# Patient Record
Sex: MALE | Race: WHITE | HISPANIC OR LATINO | Employment: PART TIME | URBAN - METROPOLITAN AREA
[De-identification: names, ages, dates, MRNs, and addresses within clinical notes are randomized per-mention and may not be internally consistent; named-entity substitution may affect disease eponyms.]

---

## 2024-01-05 ENCOUNTER — LAB (OUTPATIENT)
Dept: LAB | Facility: LAB | Age: 29
End: 2024-01-05
Payer: COMMERCIAL

## 2024-01-05 ENCOUNTER — OFFICE VISIT (OUTPATIENT)
Dept: PRIMARY CARE | Facility: CLINIC | Age: 29
End: 2024-01-05
Payer: COMMERCIAL

## 2024-01-05 VITALS
WEIGHT: 155 LBS | HEART RATE: 67 BPM | OXYGEN SATURATION: 99 % | DIASTOLIC BLOOD PRESSURE: 70 MMHG | HEIGHT: 71 IN | SYSTOLIC BLOOD PRESSURE: 137 MMHG | BODY MASS INDEX: 21.7 KG/M2

## 2024-01-05 DIAGNOSIS — Z00.00 ROUTINE HEALTH MAINTENANCE: ICD-10-CM

## 2024-01-05 DIAGNOSIS — Z11.59 NEED FOR HEPATITIS B SCREENING TEST: ICD-10-CM

## 2024-01-05 DIAGNOSIS — L21.0 DANDRUFF IN ADULT: ICD-10-CM

## 2024-01-05 DIAGNOSIS — Z11.3 ROUTINE SCREENING FOR STI (SEXUALLY TRANSMITTED INFECTION): ICD-10-CM

## 2024-01-05 DIAGNOSIS — Z11.4 SCREENING FOR HIV (HUMAN IMMUNODEFICIENCY VIRUS): Primary | ICD-10-CM

## 2024-01-05 DIAGNOSIS — Z11.4 SCREENING FOR HIV (HUMAN IMMUNODEFICIENCY VIRUS): ICD-10-CM

## 2024-01-05 DIAGNOSIS — Z11.59 NEED FOR HEPATITIS C SCREENING TEST: ICD-10-CM

## 2024-01-05 PROBLEM — R07.9 CHEST PAIN: Status: ACTIVE | Noted: 2024-01-05

## 2024-01-05 PROBLEM — M10.9 GOUT: Status: ACTIVE | Noted: 2024-01-05

## 2024-01-05 PROBLEM — K40.90 LEFT INGUINAL HERNIA: Status: ACTIVE | Noted: 2024-01-05

## 2024-01-05 PROBLEM — R00.2 PALPITATIONS: Status: ACTIVE | Noted: 2024-01-05

## 2024-01-05 LAB
ALBUMIN SERPL BCP-MCNC: 4.8 G/DL (ref 3.4–5)
ALP SERPL-CCNC: 64 U/L (ref 33–120)
ALT SERPL W P-5'-P-CCNC: 11 U/L (ref 10–52)
ANION GAP SERPL CALC-SCNC: 8 MMOL/L (ref 10–20)
AST SERPL W P-5'-P-CCNC: 13 U/L (ref 9–39)
BILIRUB SERPL-MCNC: 0.5 MG/DL (ref 0–1.2)
BUN SERPL-MCNC: 9 MG/DL (ref 6–23)
CALCIUM SERPL-MCNC: 9.3 MG/DL (ref 8.6–10.3)
CHLORIDE SERPL-SCNC: 103 MMOL/L (ref 98–107)
CHOLEST SERPL-MCNC: 126 MG/DL (ref 0–199)
CHOLESTEROL/HDL RATIO: 2.8
CO2 SERPL-SCNC: 30 MMOL/L (ref 21–32)
CREAT SERPL-MCNC: 0.7 MG/DL (ref 0.5–1.3)
ERYTHROCYTE [DISTWIDTH] IN BLOOD BY AUTOMATED COUNT: 12.1 % (ref 11.5–14.5)
GFR SERPL CREATININE-BSD FRML MDRD: >90 ML/MIN/1.73M*2
GLUCOSE SERPL-MCNC: 89 MG/DL (ref 74–99)
HBV SURFACE AG SERPL QL IA: NONREACTIVE
HCT VFR BLD AUTO: 47.6 % (ref 41–52)
HCV AB SER QL: NONREACTIVE
HDLC SERPL-MCNC: 44.8 MG/DL
HERPES SIMPLEX VIRUS 1 IGG: 0.6 INDEX
HERPES SIMPLEX VIRUS 2 IGG: <0.2 INDEX
HGB BLD-MCNC: 15.9 G/DL (ref 13.5–17.5)
HIV 1+2 AB+HIV1 P24 AG SERPL QL IA: NONREACTIVE
LDLC SERPL CALC-MCNC: 64 MG/DL
MCH RBC QN AUTO: 30.7 PG (ref 26–34)
MCHC RBC AUTO-ENTMCNC: 33.4 G/DL (ref 32–36)
MCV RBC AUTO: 92 FL (ref 80–100)
NON HDL CHOLESTEROL: 81 MG/DL (ref 0–149)
NRBC BLD-RTO: 0 /100 WBCS (ref 0–0)
PLATELET # BLD AUTO: 246 X10*3/UL (ref 150–450)
POTASSIUM SERPL-SCNC: 3.9 MMOL/L (ref 3.5–5.3)
PROT SERPL-MCNC: 7.4 G/DL (ref 6.4–8.2)
RBC # BLD AUTO: 5.18 X10*6/UL (ref 4.5–5.9)
SODIUM SERPL-SCNC: 137 MMOL/L (ref 136–145)
T PALLIDUM AB SER QL: NONREACTIVE
TRIGL SERPL-MCNC: 87 MG/DL (ref 0–149)
VLDL: 17 MG/DL (ref 0–40)
WBC # BLD AUTO: 5.1 X10*3/UL (ref 4.4–11.3)

## 2024-01-05 PROCEDURE — 86695 HERPES SIMPLEX TYPE 1 TEST: CPT

## 2024-01-05 PROCEDURE — 86803 HEPATITIS C AB TEST: CPT

## 2024-01-05 PROCEDURE — 86696 HERPES SIMPLEX TYPE 2 TEST: CPT

## 2024-01-05 PROCEDURE — 86694 HERPES SIMPLEX NES ANTBDY: CPT

## 2024-01-05 PROCEDURE — 87340 HEPATITIS B SURFACE AG IA: CPT

## 2024-01-05 PROCEDURE — 99395 PREV VISIT EST AGE 18-39: CPT | Performed by: FAMILY MEDICINE

## 2024-01-05 PROCEDURE — 87800 DETECT AGNT MULT DNA DIREC: CPT

## 2024-01-05 PROCEDURE — 85027 COMPLETE CBC AUTOMATED: CPT

## 2024-01-05 PROCEDURE — 80061 LIPID PANEL: CPT

## 2024-01-05 PROCEDURE — 86780 TREPONEMA PALLIDUM: CPT

## 2024-01-05 PROCEDURE — 36415 COLL VENOUS BLD VENIPUNCTURE: CPT

## 2024-01-05 PROCEDURE — 1036F TOBACCO NON-USER: CPT | Performed by: FAMILY MEDICINE

## 2024-01-05 PROCEDURE — 80053 COMPREHEN METABOLIC PANEL: CPT

## 2024-01-05 PROCEDURE — 87389 HIV-1 AG W/HIV-1&-2 AB AG IA: CPT

## 2024-01-05 PROCEDURE — 87661 TRICHOMONAS VAGINALIS AMPLIF: CPT

## 2024-01-05 RX ORDER — KETOCONAZOLE 20 MG/ML
1 SHAMPOO, SUSPENSION TOPICAL 2 TIMES WEEKLY
COMMUNITY
End: 2024-01-05 | Stop reason: SDUPTHER

## 2024-01-05 RX ORDER — KETOCONAZOLE 20 MG/ML
1 SHAMPOO, SUSPENSION TOPICAL 2 TIMES WEEKLY
Qty: 120 ML | Refills: 2 | Status: SHIPPED | OUTPATIENT
Start: 2024-01-08

## 2024-01-05 SDOH — HEALTH STABILITY: PHYSICAL HEALTH: ON AVERAGE, HOW MANY MINUTES DO YOU ENGAGE IN EXERCISE AT THIS LEVEL?: 0 MIN

## 2024-01-05 SDOH — ECONOMIC STABILITY: TRANSPORTATION INSECURITY
IN THE PAST 12 MONTHS, HAS LACK OF TRANSPORTATION KEPT YOU FROM MEETINGS, WORK, OR FROM GETTING THINGS NEEDED FOR DAILY LIVING?: PATIENT DECLINED

## 2024-01-05 SDOH — HEALTH STABILITY: PHYSICAL HEALTH: ON AVERAGE, HOW MANY DAYS PER WEEK DO YOU ENGAGE IN MODERATE TO STRENUOUS EXERCISE (LIKE A BRISK WALK)?: 0 DAYS

## 2024-01-05 SDOH — ECONOMIC STABILITY: TRANSPORTATION INSECURITY
IN THE PAST 12 MONTHS, HAS THE LACK OF TRANSPORTATION KEPT YOU FROM MEDICAL APPOINTMENTS OR FROM GETTING MEDICATIONS?: PATIENT DECLINED

## 2024-01-05 SDOH — ECONOMIC STABILITY: GENERAL
WHICH OF THE FOLLOWING DO YOU KNOW HOW TO USE AND HAVE ACCESS TO EVERY DAY? (CHOOSE ALL THAT APPLY): DESKTOP COMPUTER, LAPTOP COMPUTER, OR TABLET WITH BROADBAND INTERNET CONNECTION;SMARTPHONE WITH CELLULAR DATA PLAN

## 2024-01-05 ASSESSMENT — SOCIAL DETERMINANTS OF HEALTH (SDOH): HOW HARD IS IT FOR YOU TO PAY FOR THE VERY BASICS LIKE FOOD, HOUSING, MEDICAL CARE, AND HEATING?: PATIENT DECLINED

## 2024-01-05 ASSESSMENT — ENCOUNTER SYMPTOMS
HEADACHES: 0
FEVER: 0
SLEEP DISTURBANCE: 1
COUGH: 0
NERVOUS/ANXIOUS: 1

## 2024-01-05 NOTE — PROGRESS NOTES
"Subjective   Patient ID: Rory Colon is a 28 y.o. male who presents for Annual Exam.    HPI   He is here today for annual physical  He is interested in STI screening.  He had a new female partner approximately 3 months ago  He does have a history of chlamydia which was treated in the past  Since exposure he has noticed slight tingling as well as a possible rash in his genital area.  No urethral discharge  He is using ketoconazole shampoo monthly for dandruff which seems to work well  He does not smoke.  He does drink alcohol, typically 1-2 drinks per day, but up to 5 on the weekends  Received Tdap vaccine in 2018  He recently lost his job in the fall and is currently looking for a new job  He has had some anxiety but feels that this has not been significant enough to need any medication  He has been doing well with diet, but is not getting as much exercise  There is a family history of breast cancer in his mother.  Colon cancer in his father in his 50s.  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  This is improving but still has a scab there    Review of Systems   Constitutional:  Negative for fever.   Respiratory:  Negative for cough.    Cardiovascular:  Negative for chest pain.   Neurological:  Negative for headaches.   Psychiatric/Behavioral:  Positive for sleep disturbance. The patient is nervous/anxious.        Objective   /70   Pulse 67   Ht 1.803 m (5' 11\")   Wt 70.3 kg (155 lb)   SpO2 99%   BMI 21.62 kg/m²     Physical Exam  Vitals reviewed.   Constitutional:       General: He is not in acute distress.     Appearance: Normal appearance. He is well-developed.   HENT:      Head: Normocephalic.      Right Ear: Tympanic membrane, ear canal and external ear normal.      Left Ear: Tympanic membrane, ear canal and external ear normal.      Nose: Nose normal.     "  Mouth/Throat:      Mouth: Mucous membranes are moist.   Eyes:      Conjunctiva/sclera: Conjunctivae normal.   Neck:      Thyroid: No thyromegaly.      Vascular: No JVD.   Cardiovascular:      Rate and Rhythm: Normal rate and regular rhythm.      Heart sounds: Normal heart sounds.   Pulmonary:      Effort: Pulmonary effort is normal.      Breath sounds: Normal breath sounds.   Abdominal:      Palpations: Abdomen is soft.      Tenderness: There is no abdominal tenderness.   Genitourinary:     Penis: Normal.    Musculoskeletal:         General: Normal range of motion.      Right lower leg: No edema.      Left lower leg: No edema.   Lymphadenopathy:      Cervical: No cervical adenopathy.   Skin:     Findings: No rash.      Comments: There is a small scab left medial thigh in the area of previous drill injury.  There is no significant swelling or mass palpated, tenderness or signs of infection   Neurological:      Mental Status: He is alert and oriented to person, place, and time.   Psychiatric:         Mood and Affect: Mood normal.         Behavior: Behavior normal.         Assessment/Plan   Problem List Items Addressed This Visit          Medium    Dandruff in adult    Relevant Medications    ketoconazole (NIZOral) 2 % shampoo (Start on 1/8/2024)     Other Visit Diagnoses       Screening for HIV (human immunodeficiency virus)    -  Primary    Relevant Orders    C. Trachomatis / N. Gonorrhoeae, Amplified Detection    Trichomonas vaginalis, Nucleic Acid Detection    HIV 1/2 Antigen/Antibody Screen with Reflex to Confirmation    HSV1 IgG and HSV2 IgG    HSV Type I/II IgM Antibody    Need for hepatitis C screening test        Relevant Orders    C. Trachomatis / N. Gonorrhoeae, Amplified Detection    Trichomonas vaginalis, Nucleic Acid Detection    Hepatitis C Antibody    HSV1 IgG and HSV2 IgG    HSV Type I/II IgM Antibody    Need for hepatitis B screening test        Relevant Orders    C. Trachomatis / N. Gonorrhoeae,  Amplified Detection    Trichomonas vaginalis, Nucleic Acid Detection    Hepatitis B Surface Antigen    HSV1 IgG and HSV2 IgG    HSV Type I/II IgM Antibody    Routine screening for STI (sexually transmitted infection)        Relevant Orders    C. Trachomatis / N. Gonorrhoeae, Amplified Detection    Trichomonas vaginalis, Nucleic Acid Detection    Syphilis Screen with Reflex    HSV1 IgG and HSV2 IgG    HSV Type I/II IgM Antibody    Routine health maintenance        Relevant Orders    CBC    Comprehensive Metabolic Panel    Lipid Panel        Healthy diet and regular exercise recommended  We discussed the importance of decreasing his alcohol intake.  He should not drink any more than 1 or 2 drinks at a time.  I did discuss that if he ever does want a referral to help with cessation he can call us and we can give him a referral  Up-to-date on tetanus vaccination.  Annual flu and COVID vaccines recommended  Recommended daily multivitamin  Routine dental cleanings recommended  We will obtain full testing for STDs.  He is also interested in getting screened for HSV-I did discuss with him ahead of time that if the antibody test is positive it does not necessarily mean that he has HSV, only that he has been previously exposed to the virus  There are no signs of any infection in previous drill injury, recommended continue to monitor and follow-up if does not fully resolve in the next few months  If otherwise doing well can follow-up in 1 year or as needed

## 2024-01-06 LAB
C TRACH RRNA SPEC QL NAA+PROBE: NEGATIVE
N GONORRHOEA DNA SPEC QL PROBE+SIG AMP: NEGATIVE
T VAGINALIS RRNA SPEC QL NAA+PROBE: NEGATIVE

## 2024-01-08 LAB — HSV1+2 IGM SER IA-ACNC: 1.4 IV

## 2024-01-09 ENCOUNTER — TELEPHONE (OUTPATIENT)
Dept: PRIMARY CARE | Facility: CLINIC | Age: 29
End: 2024-01-09
Payer: COMMERCIAL

## 2024-01-09 DIAGNOSIS — R89.4 POSITIVE TEST FOR HERPES SIMPLEX VIRUS (HSV) ANTIBODY: Primary | ICD-10-CM

## 2024-01-09 NOTE — TELEPHONE ENCOUNTER
I spoke to him over the phone regarding lab results  His HSV IgM antibody was positive at 1.  4 0.  IgG antibody was negative for HSV 1 and 2.  He has not had any recent lesions concerning for cold sore or genital HSV infection.  He was last active with a new partner in October.    We discussed HSV antibody tests in detail.  With this pattern, it would indicate that he may have been exposed to herpes simplex virus anywhere from the last few weeks to few months.  We discussed that normally an outbreak would occur 2 to 12 days after exposure, however with this pattern I cannot guarantee that he will not have an outbreak in the future.  We discussed that having a positive antibody test does not indicate that he has HSV, only that he has been exposed to the virus previously.  If he does get any symptoms including cold sores or any rash concerning for genital HSV recommended that he call or follow-up and we can discuss further evaluation and possible antiviral treatment.  We will plan on rechecking his IgM and IgG antibodies again in 3 to 4 months

## 2024-03-29 ENCOUNTER — LAB (OUTPATIENT)
Dept: LAB | Facility: LAB | Age: 29
End: 2024-03-29
Payer: COMMERCIAL

## 2024-03-29 DIAGNOSIS — R89.4 POSITIVE TEST FOR HERPES SIMPLEX VIRUS (HSV) ANTIBODY: ICD-10-CM

## 2024-03-29 LAB
HERPES SIMPLEX VIRUS 1 IGG: 0.6 INDEX
HERPES SIMPLEX VIRUS 2 IGG: <0.2 INDEX

## 2024-03-29 PROCEDURE — 36415 COLL VENOUS BLD VENIPUNCTURE: CPT

## 2024-03-29 PROCEDURE — 86695 HERPES SIMPLEX TYPE 1 TEST: CPT

## 2024-03-29 PROCEDURE — 86696 HERPES SIMPLEX TYPE 2 TEST: CPT

## 2024-03-29 PROCEDURE — 86694 HERPES SIMPLEX NES ANTBDY: CPT

## 2024-04-01 LAB — HSV1+2 IGM SER IA-ACNC: 0.91 IV

## 2024-04-02 ENCOUNTER — TELEPHONE (OUTPATIENT)
Dept: PRIMARY CARE | Facility: CLINIC | Age: 29
End: 2024-04-02
Payer: COMMERCIAL

## 2024-04-02 DIAGNOSIS — R89.4 POSITIVE TEST FOR HERPES SIMPLEX VIRUS (HSV) ANTIBODY: Primary | ICD-10-CM

## 2024-04-02 NOTE — TELEPHONE ENCOUNTER
I discussed results with him over the phone.  We ordered labs to follow-up on labs done in early January, which showed positive IgM HSV antibodies, and negative IgG antibodies.  His IgM antibodies are still positive at 0.91, but this has improved from 1.40 when checked in early January.  IgG antibodies are still negative.  He has not had any outbreaks including any cold sores or genital rash.  We discussed that this pattern would most likely indicate exposure to HSV at some point in the last several months.  If he does develop any symptoms including cold sores or genital rash, he should call us right away.  We discussed monitoring, versus rechecking his labs again, and he would prefer to recheck.  We we will recheck HSV IgG and IgM antibodies in 4 months and follow-up by phone to discuss results.  I would expect the IgM antibodies to be negative at that point

## 2024-11-26 ENCOUNTER — APPOINTMENT (OUTPATIENT)
Dept: PRIMARY CARE | Facility: CLINIC | Age: 29
End: 2024-11-26
Payer: COMMERCIAL

## 2024-11-26 VITALS
BODY MASS INDEX: 23.15 KG/M2 | TEMPERATURE: 97.3 F | HEART RATE: 81 BPM | OXYGEN SATURATION: 96 % | DIASTOLIC BLOOD PRESSURE: 70 MMHG | SYSTOLIC BLOOD PRESSURE: 111 MMHG | WEIGHT: 166 LBS

## 2024-11-26 DIAGNOSIS — J01.90 ACUTE SINUSITIS, RECURRENCE NOT SPECIFIED, UNSPECIFIED LOCATION: ICD-10-CM

## 2024-11-26 DIAGNOSIS — H60.509 ACUTE OTITIS EXTERNA, UNSPECIFIED LATERALITY, UNSPECIFIED TYPE: Primary | ICD-10-CM

## 2024-11-26 PROCEDURE — 1036F TOBACCO NON-USER: CPT | Performed by: FAMILY MEDICINE

## 2024-11-26 PROCEDURE — 99213 OFFICE O/P EST LOW 20 MIN: CPT | Performed by: FAMILY MEDICINE

## 2024-11-26 RX ORDER — AMOXICILLIN AND CLAVULANATE POTASSIUM 875; 125 MG/1; MG/1
1 TABLET, FILM COATED ORAL 2 TIMES DAILY
Qty: 14 TABLET | Refills: 0 | Status: SHIPPED | OUTPATIENT
Start: 2024-11-26 | End: 2024-12-03

## 2024-11-26 RX ORDER — NEOMYCIN SULFATE, POLYMYXIN B SULFATE AND HYDROCORTISONE 10; 3.5; 1 MG/ML; MG/ML; [USP'U]/ML
4 SUSPENSION/ DROPS AURICULAR (OTIC) 3 TIMES DAILY
Qty: 10 ML | Refills: 0 | Status: SHIPPED | OUTPATIENT
Start: 2024-11-26 | End: 2024-12-03

## 2024-11-26 ASSESSMENT — ENCOUNTER SYMPTOMS
SINUS PRESSURE: 1
FEVER: 0

## 2024-11-26 NOTE — PROGRESS NOTES
Subjective   Patient ID: Rory Colon is a 29 y.o. male who presents for Earache (Left ear pain x 3-4 weeks /Sinus pressure).    HPI       He has had left ear discomfort over the past 1 month.  This occurs on and off but will occur daily.  He has had intermittent clear drainage from his left ear.  He also gets occasional symptoms in his right ear  No trouble hearing  He has also had sinus pressure, as well as nasal congestion and postnasal drainage.      Review of Systems   Constitutional:  Negative for fever.   HENT:  Positive for congestion, ear pain and sinus pressure.        Objective   /70   Pulse 81   Temp 36.3 °C (97.3 °F) (Temporal)   Wt 75.3 kg (166 lb)   SpO2 96%   BMI 23.15 kg/m²     Physical Exam  Vitals reviewed.   Constitutional:       General: He is not in acute distress.     Appearance: Normal appearance.   HENT:      Head: Normocephalic.      Right Ear: Tympanic membrane, ear canal and external ear normal.      Left Ear: Tympanic membrane and external ear normal.      Ears:      Comments: Left external auditory canal is dry with mild erythema     Nose: Congestion present.      Comments: Left maxillary sinus tenderness to percussion     Mouth/Throat:      Mouth: Mucous membranes are moist.      Pharynx: No oropharyngeal exudate or posterior oropharyngeal erythema.   Eyes:      Conjunctiva/sclera: Conjunctivae normal.   Cardiovascular:      Rate and Rhythm: Normal rate and regular rhythm.      Heart sounds: Normal heart sounds.   Pulmonary:      Effort: Pulmonary effort is normal.      Breath sounds: Normal breath sounds.   Lymphadenopathy:      Cervical: No cervical adenopathy.   Skin:     Findings: No rash.   Neurological:      Mental Status: He is alert.   Psychiatric:         Mood and Affect: Mood normal.         Behavior: Behavior normal.         Assessment/Plan   Assessment & Plan  Acute otitis externa, unspecified laterality, unspecified type    Orders:    neomycin-polymyxin-HC  (Cortisporin) 3.5-10,000-1 mg/mL-unit/mL-% otic suspension; Administer 4 drops into affected ear(s) 3 times a day for 7 days.    Acute sinusitis, recurrence not specified, unspecified location    Orders:    amoxicillin-pot clavulanate (Augmentin) 875-125 mg tablet; Take 1 tablet by mouth 2 times a day for 7 days.    He has signs and symptoms of acute sinusitis, as well as mild left otitis externa.  We will treat with Augmentin p.o. twice daily x 7 days, as well as Cortisporin drops.  I did recommend that he follow-up in the next 2 weeks if symptoms do not resolve

## 2025-03-28 ENCOUNTER — APPOINTMENT (OUTPATIENT)
Dept: PRIMARY CARE | Facility: CLINIC | Age: 30
End: 2025-03-28
Payer: COMMERCIAL

## 2025-03-28 ENCOUNTER — HOSPITAL ENCOUNTER (OUTPATIENT)
Dept: RADIOLOGY | Facility: CLINIC | Age: 30
Discharge: HOME | End: 2025-03-28
Payer: COMMERCIAL

## 2025-03-28 VITALS
TEMPERATURE: 97.9 F | BODY MASS INDEX: 22.45 KG/M2 | HEART RATE: 75 BPM | SYSTOLIC BLOOD PRESSURE: 118 MMHG | DIASTOLIC BLOOD PRESSURE: 63 MMHG | OXYGEN SATURATION: 98 % | WEIGHT: 161 LBS

## 2025-03-28 DIAGNOSIS — L21.0 DANDRUFF IN ADULT: ICD-10-CM

## 2025-03-28 DIAGNOSIS — M25.511 RIGHT SHOULDER PAIN, UNSPECIFIED CHRONICITY: ICD-10-CM

## 2025-03-28 DIAGNOSIS — Z11.4 SCREENING FOR HIV (HUMAN IMMUNODEFICIENCY VIRUS): ICD-10-CM

## 2025-03-28 DIAGNOSIS — Z11.3 ROUTINE SCREENING FOR STI (SEXUALLY TRANSMITTED INFECTION): ICD-10-CM

## 2025-03-28 DIAGNOSIS — Z00.00 ROUTINE HEALTH MAINTENANCE: Primary | ICD-10-CM

## 2025-03-28 DIAGNOSIS — Z11.3 SCREENING FOR STD (SEXUALLY TRANSMITTED DISEASE): ICD-10-CM

## 2025-03-28 DIAGNOSIS — Z11.59 NEED FOR HEPATITIS B SCREENING TEST: ICD-10-CM

## 2025-03-28 DIAGNOSIS — Z11.59 NEED FOR HEPATITIS C SCREENING TEST: ICD-10-CM

## 2025-03-28 PROBLEM — R07.9 CHEST PAIN: Status: RESOLVED | Noted: 2024-01-05 | Resolved: 2025-03-28

## 2025-03-28 PROCEDURE — 73030 X-RAY EXAM OF SHOULDER: CPT | Mod: RT

## 2025-03-28 PROCEDURE — 99395 PREV VISIT EST AGE 18-39: CPT | Performed by: FAMILY MEDICINE

## 2025-03-28 PROCEDURE — 1036F TOBACCO NON-USER: CPT | Performed by: FAMILY MEDICINE

## 2025-03-28 RX ORDER — KETOCONAZOLE 20 MG/ML
1 SHAMPOO, SUSPENSION TOPICAL 2 TIMES WEEKLY
Qty: 120 ML | Refills: 2 | Status: SHIPPED | OUTPATIENT
Start: 2025-03-31

## 2025-03-28 SDOH — HEALTH STABILITY: PHYSICAL HEALTH: ON AVERAGE, HOW MANY MINUTES DO YOU ENGAGE IN EXERCISE AT THIS LEVEL?: 0 MIN

## 2025-03-28 SDOH — HEALTH STABILITY: PHYSICAL HEALTH: ON AVERAGE, HOW MANY DAYS PER WEEK DO YOU ENGAGE IN MODERATE TO STRENUOUS EXERCISE (LIKE A BRISK WALK)?: 0 DAYS

## 2025-03-28 ASSESSMENT — ENCOUNTER SYMPTOMS
COUGH: 0
NECK PAIN: 1
TINGLING: 1
FEVER: 0
ARTHRALGIAS: 1
NUMBNESS: 0

## 2025-03-28 ASSESSMENT — PATIENT HEALTH QUESTIONNAIRE - PHQ9
1. LITTLE INTEREST OR PLEASURE IN DOING THINGS: NOT AT ALL
SUM OF ALL RESPONSES TO PHQ9 QUESTIONS 1 & 2: 0
2. FEELING DOWN, DEPRESSED OR HOPELESS: NOT AT ALL

## 2025-03-28 NOTE — PROGRESS NOTES
Subjective   Patient ID: Rory Colon is a 29 y.o. male who presents for Annual Exam and Shoulder Pain.    Shoulder Pain   The pain is present in the right shoulder. Episode onset: since about janurary. The quality of the pain is described as sharp (Pop). Associated symptoms include tingling. Pertinent negatives include no fever or numbness.          He is here today for annual physical and also to discuss right shoulder pain  He has had right shoulder pain which has been present for 2 to 3 months.  At onset he had been moving cubicle walls and one of them fell and landed on his right shoulder  Pain is located superior aspect of the shoulder near his AC joint.  It has gotten slightly better but has still been present.  Pain can be up to a 7-8 out of 10.  It can feel like a tearing pain with certain movements.  This gets worse with overhead and reaching behind his back.  He uses ketoconazole shampoo as needed for dandruff and reports this works well.  He will typically use 1-2 times per month  He is interested in full STD screening.  He last had a new partner this past summer.  No new partners in the past 6 months.  Denies any symptoms including rash or dysuria  There is a family history of colon cancer in his father.  He previously had a colonoscopy in 2022 and repeat at age 40 recommended  Mother had history of breast cancer  He received Tdap vaccine in 2018    Patient Health Questionnaire-2 Score: 0 (3/28/2025 10:06 AM)          Review of Systems   Constitutional:  Negative for fever.   Respiratory:  Negative for cough.    Cardiovascular:  Negative for chest pain.   Musculoskeletal:  Positive for arthralgias and neck pain.   Skin:  Negative for rash.   Neurological:  Positive for tingling. Negative for numbness.       Objective   /63   Pulse 75   Temp 36.6 °C (97.9 °F) (Temporal)   Wt 73 kg (161 lb)   SpO2 98%   BMI 22.45 kg/m²     Physical Exam  Vitals reviewed.   Constitutional:       General: He  is not in acute distress.     Appearance: Normal appearance. He is well-developed.   HENT:      Head: Normocephalic.      Right Ear: Tympanic membrane, ear canal and external ear normal.      Left Ear: Tympanic membrane, ear canal and external ear normal.      Nose: Nose normal.      Mouth/Throat:      Mouth: Mucous membranes are moist.   Eyes:      Conjunctiva/sclera: Conjunctivae normal.   Neck:      Thyroid: No thyromegaly.      Vascular: No JVD.   Cardiovascular:      Rate and Rhythm: Normal rate and regular rhythm.      Heart sounds: Normal heart sounds.   Pulmonary:      Effort: Pulmonary effort is normal.      Breath sounds: Normal breath sounds.   Musculoskeletal:         General: Tenderness present.      Comments: Right shoulder: Tenderness directly over the AC joint.  No subacromial tenderness.  He is able to fully abduct his shoulder.  Rotator cuff strength is plus 5 out of 5 with abduction and external rotation   Lymphadenopathy:      Cervical: No cervical adenopathy.   Skin:     Findings: No rash.   Neurological:      Mental Status: He is alert and oriented to person, place, and time.   Psychiatric:         Mood and Affect: Mood normal.         Behavior: Behavior normal.         Assessment/Plan   Assessment & Plan  Dandruff in adult    Orders:    ketoconazole (NIZOral) 2 % shampoo; Apply 1 Application topically 2 times a week.    CBC; Future    Comprehensive Metabolic Panel; Future    Lipid Panel; Future    Syphilis Screen with Reflex; Future    HSV1 IgG and HSV2 IgG; Future    Screening for STD (sexually transmitted disease)    Orders:    CBC; Future    Comprehensive Metabolic Panel; Future    Lipid Panel; Future    C. trachomatis / N. gonorrhoeae, Amplified, Urogenital; Future    Syphilis Screen with Reflex; Future    HSV1 IgG and HSV2 IgG; Future    Screening for HIV (human immunodeficiency virus)    Orders:    CBC; Future    Comprehensive Metabolic Panel; Future    Lipid Panel; Future    HIV 1/2  Antigen/Antibody Screen with Reflex to Confirmation; Future    Syphilis Screen with Reflex; Future    HSV1 IgG and HSV2 IgG; Future    Need for hepatitis C screening test    Orders:    CBC; Future    Comprehensive Metabolic Panel; Future    Lipid Panel; Future    Hepatitis C Antibody; Future    Syphilis Screen with Reflex; Future    HSV1 IgG and HSV2 IgG; Future    Need for hepatitis B screening test    Orders:    CBC; Future    Comprehensive Metabolic Panel; Future    Lipid Panel; Future    Hepatitis B Surface Antigen; Future    Syphilis Screen with Reflex; Future    HSV1 IgG and HSV2 IgG; Future    Routine screening for STI (sexually transmitted infection)    Orders:    CBC; Future    Comprehensive Metabolic Panel; Future    Lipid Panel; Future    Syphilis Screen with Reflex; Future    HSV1 IgG and HSV2 IgG; Future    Routine health maintenance    Orders:    CBC; Future    Comprehensive Metabolic Panel; Future    Lipid Panel; Future    Syphilis Screen with Reflex; Future    HSV1 IgG and HSV2 IgG; Future    Right shoulder pain, unspecified chronicity    Orders:    XR shoulder right 2+ views; Future    Healthy diet and regular exercise recommended  Check screening labs including glucose and lipid panel  He also is interested in being screened for full STDs, so we will add this to his lab order  Up-to-date on tetanus vaccine  Colonoscopy recommended at age 40 due to family history of colon cancer  Right shoulder pain: Most of his pain is located in the area of the AC joint.  We will order an x-ray to further evaluate.  He declines a referral for PT or a prescription strength anti-inflammatory at this time.  If the pain does not resolve, recommended contacting me and we can consider either a PT referral or referral to see orthopedics

## 2025-03-28 NOTE — ASSESSMENT & PLAN NOTE
Orders:    ketoconazole (NIZOral) 2 % shampoo; Apply 1 Application topically 2 times a week.    CBC; Future    Comprehensive Metabolic Panel; Future    Lipid Panel; Future    Syphilis Screen with Reflex; Future    HSV1 IgG and HSV2 IgG; Future

## 2025-03-29 LAB
ALBUMIN SERPL-MCNC: 4.6 G/DL (ref 3.6–5.1)
ALP SERPL-CCNC: 71 U/L (ref 36–130)
ALT SERPL-CCNC: 9 U/L (ref 9–46)
ANION GAP SERPL CALCULATED.4IONS-SCNC: 6 MMOL/L (CALC) (ref 7–17)
AST SERPL-CCNC: 11 U/L (ref 10–40)
BILIRUB SERPL-MCNC: 0.3 MG/DL (ref 0.2–1.2)
BUN SERPL-MCNC: 14 MG/DL (ref 7–25)
C TRACH RRNA SPEC QL NAA+PROBE: NOT DETECTED
CALCIUM SERPL-MCNC: 9.4 MG/DL (ref 8.6–10.3)
CHLORIDE SERPL-SCNC: 108 MMOL/L (ref 98–110)
CHOLEST SERPL-MCNC: 122 MG/DL
CHOLEST/HDLC SERPL: 2.4 (CALC)
CO2 SERPL-SCNC: 26 MMOL/L (ref 20–32)
CREAT SERPL-MCNC: 0.58 MG/DL (ref 0.6–1.24)
EGFRCR SERPLBLD CKD-EPI 2021: 135 ML/MIN/1.73M2
ERYTHROCYTE [DISTWIDTH] IN BLOOD BY AUTOMATED COUNT: 12.1 % (ref 11–15)
GLUCOSE SERPL-MCNC: 101 MG/DL (ref 65–99)
HBV SURFACE AG SERPL QL IA: NORMAL
HCT VFR BLD AUTO: 43 % (ref 38.5–50)
HCV AB SERPL QL IA: NORMAL
HDLC SERPL-MCNC: 51 MG/DL
HGB BLD-MCNC: 14.7 G/DL (ref 13.2–17.1)
HIV 1+2 AB+HIV1 P24 AG SERPL QL IA: NORMAL
HSV1 IGG SER IA-ACNC: NORMAL
HSV2 IGG SER IA-ACNC: NORMAL
LDLC SERPL CALC-MCNC: 54 MG/DL (CALC)
MCH RBC QN AUTO: 30.9 PG (ref 27–33)
MCHC RBC AUTO-ENTMCNC: 34.2 G/DL (ref 32–36)
MCV RBC AUTO: 90.5 FL (ref 80–100)
N GONORRHOEA RRNA SPEC QL NAA+PROBE: NOT DETECTED
NONHDLC SERPL-MCNC: 71 MG/DL (CALC)
PLATELET # BLD AUTO: 231 THOUSAND/UL (ref 140–400)
PMV BLD REES-ECKER: 11.3 FL (ref 7.5–12.5)
POTASSIUM SERPL-SCNC: 5.1 MMOL/L (ref 3.5–5.3)
PROT SERPL-MCNC: 7.2 G/DL (ref 6.1–8.1)
QUEST GC CT AMPLIFIED (ALWAYS MESSAGE): NORMAL
RBC # BLD AUTO: 4.75 MILLION/UL (ref 4.2–5.8)
SODIUM SERPL-SCNC: 140 MMOL/L (ref 135–146)
T PALLIDUM AB SER QL IA: NORMAL
TRIGL SERPL-MCNC: 86 MG/DL
WBC # BLD AUTO: 6.2 THOUSAND/UL (ref 3.8–10.8)

## 2025-03-31 ENCOUNTER — TELEPHONE (OUTPATIENT)
Dept: PRIMARY CARE | Facility: CLINIC | Age: 30
End: 2025-03-31
Payer: COMMERCIAL

## 2025-03-31 NOTE — TELEPHONE ENCOUNTER
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  Negative for type II IgG antibodies  Previous labs checked 1 year ago showed positive IgM antibodies for HSV.  IgG antibodies were negative at that time  He denies any cold sores or rash concerning for HSV.  We discussed that this pattern would most likely indicates that he was exposed to type I HSV within the past 1 to 2 years.  Recommended that he contact me if he does develop any rash concerning for HSV or cold sores  All of his other STI screening is negative so far.  We are still waiting on his syphilis testing results  His glucose was mildly elevated at 101.  We discussed avoiding concentrated sugars and sweets in diet and we will check this again in 1 year

## 2025-04-03 LAB
ALBUMIN SERPL-MCNC: 4.6 G/DL (ref 3.6–5.1)
ALP SERPL-CCNC: 71 U/L (ref 36–130)
ALT SERPL-CCNC: 9 U/L (ref 9–46)
ANION GAP SERPL CALCULATED.4IONS-SCNC: 6 MMOL/L (CALC) (ref 7–17)
AST SERPL-CCNC: 11 U/L (ref 10–40)
BILIRUB SERPL-MCNC: 0.3 MG/DL (ref 0.2–1.2)
BUN SERPL-MCNC: 14 MG/DL (ref 7–25)
C TRACH RRNA SPEC QL NAA+PROBE: NOT DETECTED
CALCIUM SERPL-MCNC: 9.4 MG/DL (ref 8.6–10.3)
CHLORIDE SERPL-SCNC: 108 MMOL/L (ref 98–110)
CHOLEST SERPL-MCNC: 122 MG/DL
CHOLEST/HDLC SERPL: 2.4 (CALC)
CO2 SERPL-SCNC: 26 MMOL/L (ref 20–32)
CREAT SERPL-MCNC: 0.58 MG/DL (ref 0.6–1.24)
EGFRCR SERPLBLD CKD-EPI 2021: 135 ML/MIN/1.73M2
ERYTHROCYTE [DISTWIDTH] IN BLOOD BY AUTOMATED COUNT: 12.1 % (ref 11–15)
GLUCOSE SERPL-MCNC: 101 MG/DL (ref 65–99)
HBV SURFACE AG SERPL QL IA: NORMAL
HCT VFR BLD AUTO: 43 % (ref 38.5–50)
HCV AB SERPL QL IA: NORMAL
HDLC SERPL-MCNC: 51 MG/DL
HGB BLD-MCNC: 14.7 G/DL (ref 13.2–17.1)
HIV 1+2 AB+HIV1 P24 AG SERPL QL IA: NORMAL
HSV1 IGG SER IA-ACNC: 2.09 INDEX
HSV2 IGG SER IA-ACNC: <0.9 INDEX
LDLC SERPL CALC-MCNC: 54 MG/DL (CALC)
MCH RBC QN AUTO: 30.9 PG (ref 27–33)
MCHC RBC AUTO-ENTMCNC: 34.2 G/DL (ref 32–36)
MCV RBC AUTO: 90.5 FL (ref 80–100)
N GONORRHOEA RRNA SPEC QL NAA+PROBE: NOT DETECTED
NONHDLC SERPL-MCNC: 71 MG/DL (CALC)
PLATELET # BLD AUTO: 231 THOUSAND/UL (ref 140–400)
PMV BLD REES-ECKER: 11.3 FL (ref 7.5–12.5)
POTASSIUM SERPL-SCNC: 5.1 MMOL/L (ref 3.5–5.3)
PROT SERPL-MCNC: 7.2 G/DL (ref 6.1–8.1)
QUEST GC CT AMPLIFIED (ALWAYS MESSAGE): NORMAL
RBC # BLD AUTO: 4.75 MILLION/UL (ref 4.2–5.8)
SODIUM SERPL-SCNC: 140 MMOL/L (ref 135–146)
T PALLIDUM AB SER QL IA: NEGATIVE
TRIGL SERPL-MCNC: 86 MG/DL
WBC # BLD AUTO: 6.2 THOUSAND/UL (ref 3.8–10.8)

## 2025-07-31 ENCOUNTER — OFFICE VISIT (OUTPATIENT)
Dept: PRIMARY CARE | Facility: CLINIC | Age: 30
End: 2025-07-31
Payer: COMMERCIAL

## 2025-07-31 VITALS
WEIGHT: 158 LBS | HEART RATE: 75 BPM | SYSTOLIC BLOOD PRESSURE: 122 MMHG | OXYGEN SATURATION: 100 % | RESPIRATION RATE: 18 BRPM | BODY MASS INDEX: 22.04 KG/M2 | DIASTOLIC BLOOD PRESSURE: 80 MMHG | TEMPERATURE: 97.5 F

## 2025-07-31 DIAGNOSIS — H60.502 ACUTE OTITIS EXTERNA OF LEFT EAR, UNSPECIFIED TYPE: Primary | ICD-10-CM

## 2025-07-31 PROCEDURE — 1036F TOBACCO NON-USER: CPT | Performed by: NURSE PRACTITIONER

## 2025-07-31 PROCEDURE — 99213 OFFICE O/P EST LOW 20 MIN: CPT | Performed by: NURSE PRACTITIONER

## 2025-07-31 RX ORDER — OFLOXACIN 3 MG/ML
5 SOLUTION AURICULAR (OTIC) 2 TIMES DAILY
Qty: 0.35 ML | Refills: 0 | Status: SHIPPED | OUTPATIENT
Start: 2025-07-31 | End: 2025-08-07

## 2025-07-31 ASSESSMENT — ENCOUNTER SYMPTOMS
COUGH: 0
DIARRHEA: 0
CHILLS: 0
DIZZINESS: 1
HEADACHES: 0
FEVER: 0
APPETITE CHANGE: 0
SORE THROAT: 0
SLEEP DISTURBANCE: 1
NAUSEA: 0
VOMITING: 0
ACTIVITY CHANGE: 0
SINUS PAIN: 0
SINUS PRESSURE: 1

## 2025-07-31 NOTE — PROGRESS NOTES
Subjective   Patient ID: Rory Colon is a 29 y.o. male who presents for Earache (Left ear pain/Started with right ear a few weeks ago and keeps going back and forth/SX started a few days ago).    L ear pain x weeks  Started with R ear and that has improved  Now L ear is hurting  Pain comes and goes, but has been more often lately  Dizzy at times  Hurts jaw/ tears    OTC- motrin/      Earache   There is pain in the left ear. This is a new problem. Episode onset: few weeks. The problem occurs every few minutes. The problem has been waxing and waning. There has been no fever. The pain is at a severity of 7/10. Associated symptoms include hearing loss. Pertinent negatives include no coughing, diarrhea, headaches, sore throat or vomiting. Associated symptoms comments: dizziness. Treatments tried: motrin. The treatment provided mild relief.   X      Review of Systems   Constitutional:  Negative for activity change, appetite change, chills and fever.   HENT:  Positive for ear pain, hearing loss and sinus pressure. Negative for congestion, sinus pain and sore throat.    Respiratory:  Negative for cough.    Gastrointestinal:  Negative for diarrhea, nausea and vomiting.   Neurological:  Positive for dizziness. Negative for headaches.   Psychiatric/Behavioral:  Positive for sleep disturbance.        Objective   /80   Pulse 75   Temp 36.4 °C (97.5 °F) (Temporal)   Resp 18   Wt 71.7 kg (158 lb)   SpO2 100%   BMI 22.04 kg/m²     Physical Exam  Vitals reviewed.   Constitutional:       General: He is not in acute distress.     Appearance: Normal appearance. He is not ill-appearing or toxic-appearing.   HENT:      Head: Normocephalic.      Right Ear: Tympanic membrane, ear canal and external ear normal.      Left Ear: Swelling and tenderness present.      Nose: Nose normal.      Mouth/Throat:      Mouth: Mucous membranes are moist.     Eyes:      Extraocular Movements: Extraocular movements intact.       Conjunctiva/sclera: Conjunctivae normal.      Pupils: Pupils are equal, round, and reactive to light.       Cardiovascular:      Rate and Rhythm: Normal rate and regular rhythm.      Pulses: Normal pulses.      Heart sounds: Normal heart sounds.   Pulmonary:      Effort: Pulmonary effort is normal.      Breath sounds: Normal breath sounds.     Musculoskeletal:      Cervical back: Normal range of motion and neck supple.     Skin:     General: Skin is warm.      Capillary Refill: Capillary refill takes less than 2 seconds.     Neurological:      General: No focal deficit present.      Mental Status: He is alert and oriented to person, place, and time.     Psychiatric:         Mood and Affect: Mood normal.         Behavior: Behavior normal.         Assessment/Plan   Diagnoses and all orders for this visit:  Acute otitis externa of left ear, unspecified type  -     ofloxacin (Floxin) 0.3 % otic solution; Administer 5 drops into the left ear 2 times a day for 7 days.    Ear drops started for L acute otitis externa. Use as directed  Can use ibuprofen as needed for fever or pain  Follow up with PCP if not improving over the next 2-3 days  ER for any uncontrolled fevers or worsening of symptoms

## 2026-03-30 ENCOUNTER — APPOINTMENT (OUTPATIENT)
Dept: PRIMARY CARE | Facility: CLINIC | Age: 31
End: 2026-03-30
Payer: COMMERCIAL